# Patient Record
Sex: FEMALE | Race: WHITE | NOT HISPANIC OR LATINO
[De-identification: names, ages, dates, MRNs, and addresses within clinical notes are randomized per-mention and may not be internally consistent; named-entity substitution may affect disease eponyms.]

---

## 2020-06-26 ENCOUNTER — ERX REFILL RESPONSE (OUTPATIENT)
Age: 75
End: 2020-06-26

## 2020-06-26 ENCOUNTER — TELEPHONE ENCOUNTER (OUTPATIENT)
Dept: URBAN - METROPOLITAN AREA CLINIC 92 | Facility: CLINIC | Age: 75
End: 2020-06-26

## 2020-06-26 RX ORDER — BUDESONIDE 3 MG/1
TAKE 3 CAPSULES BY MOUTH IN THE MORNING CAPSULE, GELATIN COATED ORAL ONCE A DAY
Qty: 30 | Refills: 0

## 2020-09-28 ENCOUNTER — OFFICE VISIT (OUTPATIENT)
Dept: URBAN - NONMETROPOLITAN AREA CLINIC 2 | Facility: CLINIC | Age: 75
End: 2020-09-28

## 2020-11-23 ENCOUNTER — ERX REFILL RESPONSE (OUTPATIENT)
Age: 75
End: 2020-11-23

## 2020-11-23 RX ORDER — BUDESONIDE 3 MG/1
TAKE 3 CAPSULES BY MOUTH IN THE MORNING CAPSULE, GELATIN COATED ORAL
Qty: 270 | Refills: 1

## 2020-12-07 ENCOUNTER — OFFICE VISIT (OUTPATIENT)
Dept: URBAN - NONMETROPOLITAN AREA CLINIC 2 | Facility: CLINIC | Age: 75
End: 2020-12-07
Payer: COMMERCIAL

## 2020-12-07 VITALS
WEIGHT: 139 LBS | HEART RATE: 88 BPM | DIASTOLIC BLOOD PRESSURE: 80 MMHG | BODY MASS INDEX: 23.16 KG/M2 | SYSTOLIC BLOOD PRESSURE: 155 MMHG | TEMPERATURE: 97.6 F | HEIGHT: 65 IN

## 2020-12-07 DIAGNOSIS — Z12.11 COLON CANCER SCREENING: ICD-10-CM

## 2020-12-07 DIAGNOSIS — K52.839 MICROSCOPIC COLITIS: ICD-10-CM

## 2020-12-07 PROCEDURE — G8420 CALC BMI NORM PARAMETERS: HCPCS | Performed by: NURSE PRACTITIONER

## 2020-12-07 PROCEDURE — 99213 OFFICE O/P EST LOW 20 MIN: CPT | Performed by: NURSE PRACTITIONER

## 2020-12-07 PROCEDURE — G9903 PT SCRN TBCO ID AS NON USER: HCPCS | Performed by: NURSE PRACTITIONER

## 2020-12-07 PROCEDURE — G8427 DOCREV CUR MEDS BY ELIG CLIN: HCPCS | Performed by: NURSE PRACTITIONER

## 2020-12-07 RX ORDER — CALCIUM PHOSPHATE TRIB/VIT D3 250-0.01MG
TABLET,CHEWABLE ORAL
Qty: 0 | Refills: 0 | Status: ACTIVE | COMMUNITY
Start: 1900-01-01

## 2020-12-07 RX ORDER — DIPHENOXYLATE HYDROCHLORIDE AND ATROPINE SULFATE 2.5; .025 MG/1; MG/1
TAKE  TABLET BY ORAL ROUTE AS NEEDED TABLET ORAL
Qty: 0 | Refills: 0 | Status: ACTIVE | COMMUNITY
Start: 1900-01-01

## 2020-12-07 RX ORDER — BUDESONIDE 3 MG/1
TAKE 3 CAPSULES BY MOUTH IN THE MORNING CAPSULE, GELATIN COATED ORAL
Qty: 270 | Refills: 1 | Status: ACTIVE | COMMUNITY

## 2020-12-07 RX ORDER — BISOPROLOL FUMARATE 10 MG/1
TAKE 1 TABLET (10 MG) BY ORAL ROUTE ONCE DAILY TABLET, FILM COATED ORAL 1
Qty: 0 | Refills: 0 | Status: ACTIVE | COMMUNITY
Start: 1900-01-01

## 2020-12-07 RX ORDER — MELATONIN/PYRIDOXINE HCL (B6) 5 MG-10 MG
TAKE 1 CAPSULE BY ORAL ROUTE DAILY TABLET,IMMED, EXTENDED RELEASE, BIPHASIC ORAL 1
Qty: 0 | Refills: 0 | Status: ACTIVE | COMMUNITY
Start: 1900-01-01

## 2020-12-07 RX ORDER — METFORMIN HYDROCHLORIDE 500 MG/1
TAKE 4 TABLETS BY ORAL ROUTE DAILY TABLET, COATED ORAL 1
Qty: 0 | Refills: 0 | Status: ACTIVE | COMMUNITY
Start: 1900-01-01

## 2020-12-07 RX ORDER — LEVOTHYROXINE SODIUM 0.05 MG/1
TAKE 1 TABLET (50 MCG) BY ORAL ROUTE ONCE DAILY TABLET ORAL 1
Qty: 0 | Refills: 0 | Status: ACTIVE | COMMUNITY
Start: 1900-01-01

## 2020-12-07 RX ORDER — RAMIPRIL 2.5 MG/1
TAKE 1 CAPSULE (2.5 MG) BY ORAL ROUTE ONCE DAILY CAPSULE ORAL 1
Qty: 0 | Refills: 0 | Status: ACTIVE | COMMUNITY
Start: 1900-01-01

## 2020-12-07 RX ORDER — ZOLPIDEM TARTRATE 10 MG/1
TAKE 1 TABLET (10 MG) BY ORAL ROUTE ONCE DAILY AT BEDTIME TABLET, FILM COATED ORAL 1
Qty: 0 | Refills: 0 | Status: ACTIVE | COMMUNITY
Start: 1900-01-01

## 2020-12-07 RX ORDER — EZETIMIBE AND SIMVASTATIN 10; 40 MG/1; MG/1
TAKE 1 TABLET BY ORAL ROUTE ONCE DAILY TABLET ORAL 1
Qty: 0 | Refills: 0 | Status: ACTIVE | COMMUNITY
Start: 1900-01-01

## 2020-12-07 RX ORDER — POTASSIUM CHLORIDE 1.5 G/1
POWDER, FOR SOLUTION ORAL
Qty: 0 | Refills: 0 | Status: ACTIVE | COMMUNITY
Start: 1900-01-01

## 2020-12-07 RX ORDER — BUDESONIDE 3 MG/1
TAKE 3 CAPSULES BY MOUTH IN THE MORNING CAPSULE, GELATIN COATED ORAL ONCE A DAY
Qty: 270 | Refills: 3

## 2020-12-07 RX ORDER — ASCORBIC ACID 500 MG
TAKE 1 CAPSULE BY ORAL ROUTE DAILY CAPSULE, EXTENDED RELEASE ORAL 1
Qty: 0 | Refills: 0 | Status: ACTIVE | COMMUNITY
Start: 1900-01-01

## 2020-12-07 RX ORDER — TRIAMCINOLONE ACETONIDE 1 MG/ML
LOTION TOPICAL
Qty: 0 | Refills: 0 | Status: ACTIVE | COMMUNITY
Start: 1900-01-01

## 2020-12-07 RX ORDER — CHLORTHALIDONE 25 MG/1
TABLET ORAL
Qty: 0 | Refills: 0 | Status: ACTIVE | COMMUNITY
Start: 1900-01-01

## 2020-12-07 RX ORDER — ALLOPURINOL 300 MG/1
TAKE 1 TABLET (300 MG) BY ORAL ROUTE ONCE DAILY TABLET ORAL 1
Qty: 0 | Refills: 0 | Status: ACTIVE | COMMUNITY
Start: 1900-01-01

## 2020-12-07 NOTE — HPI-TODAY'S VISIT:
12/7/2020  Ms. Marnie Escobedo returns for follow up of collagenous colitis. She was dx in 2016. She has been on budesonide 3mg daily since this time. She has tried to stop taking this but her symptoms always return. Over the last year, she has been having more flare ups. These will last for about 3 weeks with 7 BM a day followed by 3 good weeks with 2 BMs a day. She will take imodium if she has to go out for the day otherwise she just deals with the diarrhea. She had recent labs and her GFR is lower and she is low in B12. She is very hesitant to change her medications due to kidney function. CS

## 2020-12-07 NOTE — HPI-OTHER HISTORIES
12/11/2018  Ms. Marnie Escobedo returns for follow up of collagenous colitis. She was dx in 2016. She has been on budesonide 3mg daily since this time. She has tried to taper off and take only as needed but her symptoms return. She will at times need two pills a day. Overall, she is doing and feeling well. She has identified a few triggers which helps.  CS

## 2021-01-21 ENCOUNTER — OFFICE VISIT (OUTPATIENT)
Dept: URBAN - NONMETROPOLITAN AREA CLINIC 2 | Facility: CLINIC | Age: 76
End: 2021-01-21

## 2021-02-17 ENCOUNTER — OFFICE VISIT (OUTPATIENT)
Dept: URBAN - NONMETROPOLITAN AREA CLINIC 2 | Facility: CLINIC | Age: 76
End: 2021-02-17
Payer: COMMERCIAL

## 2021-02-17 ENCOUNTER — TELEPHONE ENCOUNTER (OUTPATIENT)
Dept: URBAN - METROPOLITAN AREA CLINIC 92 | Facility: CLINIC | Age: 76
End: 2021-02-17

## 2021-02-17 DIAGNOSIS — Z12.11 COLON CANCER SCREENING: ICD-10-CM

## 2021-02-17 DIAGNOSIS — K52.839 MICROSCOPIC COLITIS: ICD-10-CM

## 2021-02-17 PROCEDURE — G8420 CALC BMI NORM PARAMETERS: HCPCS | Performed by: NURSE PRACTITIONER

## 2021-02-17 PROCEDURE — 99213 OFFICE O/P EST LOW 20 MIN: CPT | Performed by: NURSE PRACTITIONER

## 2021-02-17 PROCEDURE — G8427 DOCREV CUR MEDS BY ELIG CLIN: HCPCS | Performed by: NURSE PRACTITIONER

## 2021-02-17 PROCEDURE — G9903 PT SCRN TBCO ID AS NON USER: HCPCS | Performed by: NURSE PRACTITIONER

## 2021-02-17 RX ORDER — COLESTIPOL HYDROCHLORIDE 1 G/1
1 TABLET 30MINS PRIOR TO A MEAL TABLET, FILM COATED ORAL BID
Qty: 60 | Refills: 6
Start: 2021-02-17

## 2021-02-17 RX ORDER — ALLOPURINOL 300 MG/1
TAKE 1 TABLET (300 MG) BY ORAL ROUTE ONCE DAILY TABLET ORAL 1
Qty: 0 | Refills: 0 | Status: ACTIVE | COMMUNITY
Start: 1900-01-01

## 2021-02-17 RX ORDER — RAMIPRIL 2.5 MG/1
TAKE 1 CAPSULE (2.5 MG) BY ORAL ROUTE ONCE DAILY CAPSULE ORAL 1
Qty: 0 | Refills: 0 | Status: ACTIVE | COMMUNITY
Start: 1900-01-01

## 2021-02-17 RX ORDER — CHOLESTYRAMINE 4 G/9G
1 SCOOP POWDER, FOR SUSPENSION ORAL BID
Qty: 1 CANISTER | Refills: 6
Start: 2021-02-17

## 2021-02-17 RX ORDER — CHLORTHALIDONE 25 MG/1
TABLET ORAL
Qty: 0 | Refills: 0 | Status: ACTIVE | COMMUNITY
Start: 1900-01-01

## 2021-02-17 RX ORDER — ASCORBIC ACID 500 MG
TAKE 1 CAPSULE BY ORAL ROUTE DAILY CAPSULE, EXTENDED RELEASE ORAL 1
Qty: 0 | Refills: 0 | Status: ACTIVE | COMMUNITY
Start: 1900-01-01

## 2021-02-17 RX ORDER — TRIAMCINOLONE ACETONIDE 1 MG/ML
LOTION TOPICAL
Qty: 0 | Refills: 0 | Status: ACTIVE | COMMUNITY
Start: 1900-01-01

## 2021-02-17 RX ORDER — CALCIUM PHOSPHATE TRIB/VIT D3 250-0.01MG
TABLET,CHEWABLE ORAL
Qty: 0 | Refills: 0 | Status: ACTIVE | COMMUNITY
Start: 1900-01-01

## 2021-02-17 RX ORDER — MELATONIN/PYRIDOXINE HCL (B6) 5 MG-10 MG
TAKE 1 CAPSULE BY ORAL ROUTE DAILY TABLET,IMMED, EXTENDED RELEASE, BIPHASIC ORAL 1
Qty: 0 | Refills: 0 | Status: ACTIVE | COMMUNITY
Start: 1900-01-01

## 2021-02-17 RX ORDER — ZOLPIDEM TARTRATE 10 MG/1
TAKE 1 TABLET (10 MG) BY ORAL ROUTE ONCE DAILY AT BEDTIME TABLET, FILM COATED ORAL 1
Qty: 0 | Refills: 0 | Status: ACTIVE | COMMUNITY
Start: 1900-01-01

## 2021-02-17 RX ORDER — EZETIMIBE AND SIMVASTATIN 10; 40 MG/1; MG/1
TAKE 1 TABLET BY ORAL ROUTE ONCE DAILY TABLET ORAL 1
Qty: 0 | Refills: 0 | Status: ACTIVE | COMMUNITY
Start: 1900-01-01

## 2021-02-17 RX ORDER — BISOPROLOL FUMARATE 10 MG/1
TAKE 1 TABLET (10 MG) BY ORAL ROUTE ONCE DAILY TABLET, FILM COATED ORAL 1
Qty: 0 | Refills: 0 | Status: ACTIVE | COMMUNITY
Start: 1900-01-01

## 2021-02-17 RX ORDER — LEVOTHYROXINE SODIUM 0.05 MG/1
TAKE 1 TABLET (50 MCG) BY ORAL ROUTE ONCE DAILY TABLET ORAL 1
Qty: 0 | Refills: 0 | Status: ACTIVE | COMMUNITY
Start: 1900-01-01

## 2021-02-17 RX ORDER — POTASSIUM CHLORIDE 1.5 G/1
POWDER, FOR SOLUTION ORAL
Qty: 0 | Refills: 0 | Status: ACTIVE | COMMUNITY
Start: 1900-01-01

## 2021-02-17 RX ORDER — BUDESONIDE 3 MG/1
TAKE 3 CAPSULES BY MOUTH IN THE MORNING CAPSULE, GELATIN COATED ORAL ONCE A DAY
Qty: 270 | Refills: 3

## 2021-02-17 RX ORDER — METFORMIN HYDROCHLORIDE 500 MG/1
TAKE 4 TABLETS BY ORAL ROUTE DAILY TABLET, COATED ORAL 1
Qty: 0 | Refills: 0 | Status: ACTIVE | COMMUNITY
Start: 1900-01-01

## 2021-02-17 RX ORDER — BUDESONIDE 3 MG/1
TAKE 3 CAPSULES BY MOUTH IN THE MORNING CAPSULE, GELATIN COATED ORAL ONCE A DAY
Qty: 270 | Refills: 3 | Status: ACTIVE | COMMUNITY

## 2021-02-17 RX ORDER — CHOLESTYRAMINE 4 G/9G
1 SCOOP POWDER, FOR SUSPENSION ORAL BID
Qty: 1 CANISTER | Refills: 6 | OUTPATIENT
Start: 2021-02-17

## 2021-02-17 RX ORDER — COLESTIPOL HYDROCHLORIDE 1 G/1
1 TABLET 30MINS PRIOR TO A MEAL TABLET, FILM COATED ORAL BID
Qty: 60 | Refills: 6 | OUTPATIENT
Start: 2021-02-17

## 2021-02-17 RX ORDER — DIPHENOXYLATE HYDROCHLORIDE AND ATROPINE SULFATE 2.5; .025 MG/1; MG/1
TAKE  TABLET BY ORAL ROUTE AS NEEDED TABLET ORAL
Qty: 0 | Refills: 0 | Status: ACTIVE | COMMUNITY
Start: 1900-01-01

## 2021-02-17 NOTE — HPI-TODAY'S VISIT:
2/17/2021  Ms. Marnie Escobedo returns for follow up of collagenous colitis. She was dx in 2016. She has been on budesonide 3mg daily since this time. She has tried to stop taking this but her symptoms always return. She has never taken 9mg. She has tried 6mg for a week and it did slow her diarrhea. She is back to only one pill a day and continues to have bad days with 6 or more BM a day. She is taking 3 imodium a day and it does not seem to be helping. She talked with Dr Lincoln and ready to increase her budesonide. CS

## 2021-02-17 NOTE — HPI-OTHER HISTORIES
12/11/2018  Ms. Marnie Escobedo returns for follow up of collagenous colitis. She was dx in 2016. She has been on budesonide 3mg daily since this time. She has tried to taper off and take only as needed but her symptoms return. She will at times need two pills a day. Overall, she is doing and feeling well. She has identified a few triggers which helps.  CS   12/7/2020  Ms. Marnie Escobedo returns for follow up of collagenous colitis. She was dx in 2016. She has been on budesonide 3mg daily since this time. She has tried to stop taking this but her symptoms always return. Over the last year, she has been having more flare ups. These will last for about 3 weeks with 7 BM a day followed by 3 good weeks with 2 BMs a day. She will take imodium if she has to go out for the day otherwise she just deals with the diarrhea. She had recent labs and her GFR is lower and she is low in B12. She is very hesitant to change her medications due to kidney function. CS

## 2021-04-12 ENCOUNTER — DASHBOARD ENCOUNTERS (OUTPATIENT)
Age: 76
End: 2021-04-12

## 2021-04-12 ENCOUNTER — LAB OUTSIDE AN ENCOUNTER (OUTPATIENT)
Dept: URBAN - NONMETROPOLITAN AREA CLINIC 2 | Facility: CLINIC | Age: 76
End: 2021-04-12

## 2021-04-12 ENCOUNTER — OFFICE VISIT (OUTPATIENT)
Dept: URBAN - NONMETROPOLITAN AREA CLINIC 2 | Facility: CLINIC | Age: 76
End: 2021-04-12
Payer: COMMERCIAL

## 2021-04-12 VITALS
BODY MASS INDEX: 22.66 KG/M2 | HEIGHT: 65 IN | HEART RATE: 94 BPM | TEMPERATURE: 97.2 F | SYSTOLIC BLOOD PRESSURE: 118 MMHG | WEIGHT: 136 LBS | DIASTOLIC BLOOD PRESSURE: 76 MMHG

## 2021-04-12 DIAGNOSIS — K52.839 MICROSCOPIC COLITIS: ICD-10-CM

## 2021-04-12 DIAGNOSIS — Z12.11 COLON CANCER SCREENING: ICD-10-CM

## 2021-04-12 DIAGNOSIS — R10.32 LEFT LOWER QUADRANT ABDOMINAL PAIN: ICD-10-CM

## 2021-04-12 DIAGNOSIS — K57.92 DIVERTICULITIS: ICD-10-CM

## 2021-04-12 PROBLEM — 307496006: Status: ACTIVE | Noted: 2021-04-12

## 2021-04-12 PROCEDURE — 99214 OFFICE O/P EST MOD 30 MIN: CPT | Performed by: NURSE PRACTITIONER

## 2021-04-12 RX ORDER — TRIAMCINOLONE ACETONIDE 1 MG/ML
LOTION TOPICAL
Qty: 0 | Refills: 0 | Status: ACTIVE | COMMUNITY
Start: 1900-01-01

## 2021-04-12 RX ORDER — COLESTIPOL HYDROCHLORIDE 1 G/1
1 TABLET 30MINS PRIOR TO A MEAL TABLET, FILM COATED ORAL BID
Qty: 60 | Refills: 6 | Status: ACTIVE | COMMUNITY
Start: 2021-02-17

## 2021-04-12 RX ORDER — RAMIPRIL 2.5 MG/1
TAKE 1 CAPSULE (2.5 MG) BY ORAL ROUTE ONCE DAILY CAPSULE ORAL 1
Qty: 0 | Refills: 0 | Status: ACTIVE | COMMUNITY
Start: 1900-01-01

## 2021-04-12 RX ORDER — POTASSIUM CHLORIDE 1.5 G/1
POWDER, FOR SOLUTION ORAL
Qty: 0 | Refills: 0 | Status: ACTIVE | COMMUNITY
Start: 1900-01-01

## 2021-04-12 RX ORDER — CHOLESTYRAMINE 4 G/9G
1 SCOOP POWDER, FOR SUSPENSION ORAL BID
Qty: 1 CANISTER | Refills: 6 | Status: ACTIVE | COMMUNITY
Start: 2021-02-17

## 2021-04-12 RX ORDER — BUDESONIDE 3 MG/1
TAKE 3 CAPSULES BY MOUTH IN THE MORNING CAPSULE, GELATIN COATED ORAL ONCE A DAY
Qty: 270 | Refills: 3 | Status: ACTIVE | COMMUNITY

## 2021-04-12 RX ORDER — ZOLPIDEM TARTRATE 10 MG/1
TAKE 1 TABLET (10 MG) BY ORAL ROUTE ONCE DAILY AT BEDTIME TABLET, FILM COATED ORAL 1
Qty: 0 | Refills: 0 | Status: ACTIVE | COMMUNITY
Start: 1900-01-01

## 2021-04-12 RX ORDER — CALCIUM PHOSPHATE TRIB/VIT D3 250-0.01MG
TABLET,CHEWABLE ORAL
Qty: 0 | Refills: 0 | Status: ACTIVE | COMMUNITY
Start: 1900-01-01

## 2021-04-12 RX ORDER — BUDESONIDE 3 MG/1
TAKE 3 CAPSULES BY MOUTH IN THE MORNING CAPSULE, GELATIN COATED ORAL ONCE A DAY
Qty: 270 | Refills: 3

## 2021-04-12 RX ORDER — MELATONIN/PYRIDOXINE HCL (B6) 5 MG-10 MG
TAKE 1 CAPSULE BY ORAL ROUTE DAILY TABLET,IMMED, EXTENDED RELEASE, BIPHASIC ORAL 1
Qty: 0 | Refills: 0 | Status: ACTIVE | COMMUNITY
Start: 1900-01-01

## 2021-04-12 RX ORDER — COLESTIPOL HYDROCHLORIDE 1 G/1
1 TABLET 30MINS PRIOR TO A MEAL TABLET, FILM COATED ORAL BID
Qty: 60 | Refills: 6

## 2021-04-12 RX ORDER — EZETIMIBE AND SIMVASTATIN 10; 40 MG/1; MG/1
TAKE 1 TABLET BY ORAL ROUTE ONCE DAILY TABLET ORAL 1
Qty: 0 | Refills: 0 | Status: ACTIVE | COMMUNITY
Start: 1900-01-01

## 2021-04-12 RX ORDER — DIPHENOXYLATE HYDROCHLORIDE AND ATROPINE SULFATE 2.5; .025 MG/1; MG/1
TAKE  TABLET BY ORAL ROUTE AS NEEDED TABLET ORAL
Qty: 0 | Refills: 0 | Status: ACTIVE | COMMUNITY
Start: 1900-01-01

## 2021-04-12 RX ORDER — METFORMIN HYDROCHLORIDE 500 MG/1
TAKE 4 TABLETS BY ORAL ROUTE DAILY TABLET, COATED ORAL 1
Qty: 0 | Refills: 0 | Status: ACTIVE | COMMUNITY
Start: 1900-01-01

## 2021-04-12 RX ORDER — LEVOTHYROXINE SODIUM 0.05 MG/1
TAKE 1 TABLET (50 MCG) BY ORAL ROUTE ONCE DAILY TABLET ORAL 1
Qty: 0 | Refills: 0 | Status: ACTIVE | COMMUNITY
Start: 1900-01-01

## 2021-04-12 RX ORDER — BISOPROLOL FUMARATE 10 MG/1
TAKE 1 TABLET (10 MG) BY ORAL ROUTE ONCE DAILY TABLET, FILM COATED ORAL 1
Qty: 0 | Refills: 0 | Status: ACTIVE | COMMUNITY
Start: 1900-01-01

## 2021-04-12 RX ORDER — ASCORBIC ACID 500 MG
TAKE 1 CAPSULE BY ORAL ROUTE DAILY CAPSULE, EXTENDED RELEASE ORAL 1
Qty: 0 | Refills: 0 | Status: ACTIVE | COMMUNITY
Start: 1900-01-01

## 2021-04-12 RX ORDER — CHLORTHALIDONE 25 MG/1
TABLET ORAL
Qty: 0 | Refills: 0 | Status: ACTIVE | COMMUNITY
Start: 1900-01-01

## 2021-04-12 RX ORDER — ALLOPURINOL 300 MG/1
TAKE 1 TABLET (300 MG) BY ORAL ROUTE ONCE DAILY TABLET ORAL 1
Qty: 0 | Refills: 0 | Status: ACTIVE | COMMUNITY
Start: 1900-01-01

## 2021-04-12 RX ORDER — CHOLESTYRAMINE 4 G/9G
1 SCOOP POWDER, FOR SUSPENSION ORAL BID
Qty: 1 CANISTER | Refills: 6

## 2021-04-12 NOTE — HPI-OTHER HISTORIES
12/11/2018  Ms. Marnie Escobedo returns for follow up of collagenous colitis. She was dx in 2016. She has been on budesonide 3mg daily since this time. She has tried to taper off and take only as needed but her symptoms return. She will at times need two pills a day. Overall, she is doing and feeling well. She has identified a few triggers which helps.  CS   12/7/2020  Ms. Marnie Escobedo returns for follow up of collagenous colitis. She was dx in 2016. She has been on budesonide 3mg daily since this time. She has tried to stop taking this but her symptoms always return. Over the last year, she has been having more flare ups. These will last for about 3 weeks with 7 BM a day followed by 3 good weeks with 2 BMs a day. She will take imodium if she has to go out for the day otherwise she just deals with the diarrhea. She had recent labs and her GFR is lower and she is low in B12. She is very hesitant to change her medications due to kidney function. CS   2/17/2021  Ms. Marnie Escobedo returns for follow up of collagenous colitis. She was dx in 2016. She has been on budesonide 3mg daily since this time. She has tried to stop taking this but her symptoms always return. She has never taken 9mg. She has tried 6mg for a week and it did slow her diarrhea. She is back to only one pill a day and continues to have bad days with 6 or more BM a day. She is taking 3 imodium a day and it does not seem to be helping. She talked with Dr Lincoln and ready to increase her budesonide. CS

## 2021-04-12 NOTE — HPI-TODAY'S VISIT:
4/12/2021  Ms. Marnie Escobedo returns for follow up of collagenous colitis. She was dx in 2016. She has been on budesonide 3mg daily since this time. She has tried to stop taking this but her symptoms always return. She has never taken 9mg. She tried 6mg for a week and it did slow her diarrhea. She then went back to only one pill a day and continued to have bad days with 6 or more BM a day. She added 3 imodium a day without any improvement. At her last OV, we increased her budesonide to 9mg daily and prn colestipol. Today, she reports taking about 2 weeks of 9mg and having constipation. She then strained and started having LLQ pain. She developed fever and chills. She saw Dr Rodas and he gave her ceftinir and bentyl prn. The pain got better. The constipation continued. Her pain and fever returned. She was given a second round. She has two pills left. She is still feeling poorly. She stopped the budesonide three days ago. Her stool is not fully formed but less urgent and once a day.  She is traveling to [place] for her grandson's 4th birthday on Wednesday. CS

## 2021-04-13 ENCOUNTER — TELEPHONE ENCOUNTER (OUTPATIENT)
Dept: URBAN - METROPOLITAN AREA CLINIC 92 | Facility: CLINIC | Age: 76
End: 2021-04-13

## 2022-01-19 ENCOUNTER — ERX REFILL RESPONSE (OUTPATIENT)
Dept: URBAN - NONMETROPOLITAN AREA CLINIC 2 | Facility: CLINIC | Age: 77
End: 2022-01-19

## 2022-01-19 RX ORDER — BUDESONIDE 3 MG/1
TAKE 3 CAPSULES BY MOUTH IN THE MORNING ONCE A DAY 90 DAYS CAPSULE, GELATIN COATED ORAL
Qty: 270 CAPSULE | Refills: 4 | OUTPATIENT

## 2022-01-19 RX ORDER — BUDESONIDE 3 MG/1
TAKE 3 CAPSULES BY MOUTH IN THE MORNING CAPSULE, GELATIN COATED ORAL ONCE A DAY
Qty: 270 | Refills: 3 | OUTPATIENT

## 2022-02-16 ENCOUNTER — ERX REFILL RESPONSE (OUTPATIENT)
Dept: URBAN - NONMETROPOLITAN AREA CLINIC 2 | Facility: CLINIC | Age: 77
End: 2022-02-16

## 2022-02-16 RX ORDER — CHOLESTYRAMINE 4 G/9G
MIX 1 SCOOP AS DIRECTED TWICE A DAY POWDER, FOR SUSPENSION ORAL
Qty: 1134 GRAM | Refills: 3 | OUTPATIENT

## 2022-10-31 ENCOUNTER — ERX REFILL RESPONSE (OUTPATIENT)
Dept: URBAN - NONMETROPOLITAN AREA CLINIC 2 | Facility: CLINIC | Age: 77
End: 2022-10-31

## 2022-10-31 RX ORDER — BUDESONIDE 3 MG/1
TAKE 3 CAPSULES BY MOUTH IN THE MORNING ONCE A DAY 90 DAYS CAPSULE, GELATIN COATED ORAL
Qty: 270 CAPSULE | Refills: 0 | OUTPATIENT

## 2022-10-31 RX ORDER — BUDESONIDE 3 MG/1
TAKE 3 CAPSULES BY MOUTH IN THE MORNING ONCE A DAY 90 DAYS CAPSULE, GELATIN COATED ORAL
Qty: 270 CAPSULE | Refills: 4 | OUTPATIENT

## 2023-06-14 ENCOUNTER — ERX REFILL RESPONSE (OUTPATIENT)
Dept: URBAN - NONMETROPOLITAN AREA CLINIC 2 | Facility: CLINIC | Age: 78
End: 2023-06-14

## 2023-06-14 RX ORDER — BUDESONIDE 3 MG/1
TAKE 3 CAPSULES BY MOUTH IN THE MORNING ONCE A DAY 90 DAYS CAPSULE, GELATIN COATED ORAL
Qty: 270 CAPSULE | Refills: 0 | OUTPATIENT

## 2023-06-14 RX ORDER — BUDESONIDE 3 MG/1
TAKE 3 CAPSULES BY MOUTH IN THE MORNING ONCE A DAY FOR 90 DAYS CAPSULE, GELATIN COATED ORAL
Qty: 270 CAPSULE | Refills: 0 | OUTPATIENT

## 2024-05-13 ENCOUNTER — CLAIMS CREATED FROM THE CLAIM WINDOW (OUTPATIENT)
Dept: URBAN - METROPOLITAN AREA TELEHEALTH 2 | Facility: TELEHEALTH | Age: 79
End: 2024-05-13

## 2024-05-13 ENCOUNTER — OFFICE VISIT (OUTPATIENT)
Dept: URBAN - METROPOLITAN AREA TELEHEALTH 2 | Facility: TELEHEALTH | Age: 79
End: 2024-05-13
Payer: COMMERCIAL

## 2024-05-13 ENCOUNTER — TELEPHONE ENCOUNTER (OUTPATIENT)
Dept: URBAN - NONMETROPOLITAN AREA CLINIC 2 | Facility: CLINIC | Age: 79
End: 2024-05-13

## 2024-05-13 DIAGNOSIS — K57.92 DIVERTICULITIS: ICD-10-CM

## 2024-05-13 DIAGNOSIS — K52.839 MICROSCOPIC COLITIS: ICD-10-CM

## 2024-05-13 DIAGNOSIS — Z12.11 COLON CANCER SCREENING: ICD-10-CM

## 2024-05-13 DIAGNOSIS — R10.32 LEFT LOWER QUADRANT ABDOMINAL PAIN: ICD-10-CM

## 2024-05-13 PROCEDURE — 99213 OFFICE O/P EST LOW 20 MIN: CPT | Performed by: NURSE PRACTITIONER

## 2024-05-13 RX ORDER — CHOLESTYRAMINE 4 G/9G
MIX 1 SCOOP AS DIRECTED TWICE A DAY POWDER, FOR SUSPENSION ORAL
Qty: 1134 GRAM | Refills: 3 | Status: ACTIVE | COMMUNITY

## 2024-05-13 RX ORDER — METFORMIN HYDROCHLORIDE 500 MG/1
TAKE 4 TABLETS BY ORAL ROUTE DAILY TABLET, COATED ORAL 1
Qty: 0 | Refills: 0 | Status: ACTIVE | COMMUNITY
Start: 1900-01-01

## 2024-05-13 RX ORDER — BUDESONIDE 3 MG/1
TAKE 3 CAPSULES BY MOUTH IN THE MORNING ONCE A DAY FOR 90 DAYS CAPSULE, GELATIN COATED ORAL
Qty: 270 CAPSULE | Refills: 0 | Status: ACTIVE | COMMUNITY

## 2024-05-13 RX ORDER — BUDESONIDE 3 MG/1
TAKE 3 CAPSULES BY MOUTH IN THE MORNING CAPSULE, GELATIN COATED ORAL ONCE A DAY
Qty: 270 | Refills: 3

## 2024-05-13 RX ORDER — RAMIPRIL 2.5 MG/1
TAKE 1 CAPSULE (2.5 MG) BY ORAL ROUTE ONCE DAILY CAPSULE ORAL 1
Qty: 0 | Refills: 0 | Status: ACTIVE | COMMUNITY
Start: 1900-01-01

## 2024-05-13 RX ORDER — TRIAMCINOLONE ACETONIDE 1 MG/ML
LOTION TOPICAL
Qty: 0 | Refills: 0 | Status: ACTIVE | COMMUNITY
Start: 1900-01-01

## 2024-05-13 RX ORDER — CALCIUM PHOSPHATE TRIB/VIT D3 250-0.01MG
TABLET,CHEWABLE ORAL
Qty: 0 | Refills: 0 | Status: ACTIVE | COMMUNITY
Start: 1900-01-01

## 2024-05-13 RX ORDER — ALLOPURINOL 300 MG/1
TAKE 1 TABLET (300 MG) BY ORAL ROUTE ONCE DAILY TABLET ORAL 1
Qty: 0 | Refills: 0 | Status: ACTIVE | COMMUNITY
Start: 1900-01-01

## 2024-05-13 RX ORDER — COLESTIPOL HYDROCHLORIDE 1 G/1
1 TABLET 30MINS PRIOR TO A MEAL TABLET, FILM COATED ORAL BID
Qty: 60 | Refills: 6 | Status: ACTIVE | COMMUNITY

## 2024-05-13 RX ORDER — ASCORBIC ACID 500 MG
TAKE 1 CAPSULE BY ORAL ROUTE DAILY CAPSULE, EXTENDED RELEASE ORAL 1
Qty: 0 | Refills: 0 | Status: ACTIVE | COMMUNITY
Start: 1900-01-01

## 2024-05-13 RX ORDER — BISOPROLOL FUMARATE 10 MG/1
TAKE 1 TABLET (10 MG) BY ORAL ROUTE ONCE DAILY TABLET, FILM COATED ORAL 1
Qty: 0 | Refills: 0 | Status: ACTIVE | COMMUNITY
Start: 1900-01-01

## 2024-05-13 RX ORDER — CHLORTHALIDONE 25 MG/1
TABLET ORAL
Qty: 0 | Refills: 0 | Status: ACTIVE | COMMUNITY
Start: 1900-01-01

## 2024-05-13 RX ORDER — LEVOTHYROXINE SODIUM 0.05 MG/1
TAKE 1 TABLET (50 MCG) BY ORAL ROUTE ONCE DAILY TABLET ORAL 1
Qty: 0 | Refills: 0 | Status: ACTIVE | COMMUNITY
Start: 1900-01-01

## 2024-05-13 RX ORDER — DIPHENOXYLATE HYDROCHLORIDE AND ATROPINE SULFATE 2.5; .025 MG/1; MG/1
TAKE  TABLET BY ORAL ROUTE AS NEEDED TABLET ORAL
Qty: 0 | Refills: 0 | Status: ACTIVE | COMMUNITY
Start: 1900-01-01

## 2024-05-13 RX ORDER — MELATONIN/PYRIDOXINE HCL (B6) 5 MG-10 MG
TAKE 1 CAPSULE BY ORAL ROUTE DAILY TABLET,IMMED, EXTENDED RELEASE, BIPHASIC ORAL 1
Qty: 0 | Refills: 0 | Status: ACTIVE | COMMUNITY
Start: 1900-01-01

## 2024-05-13 RX ORDER — POTASSIUM CHLORIDE 1.5 G/1
POWDER, FOR SOLUTION ORAL
Qty: 0 | Refills: 0 | Status: ACTIVE | COMMUNITY
Start: 1900-01-01

## 2024-05-13 RX ORDER — EZETIMIBE AND SIMVASTATIN 10; 40 MG/1; MG/1
TAKE 1 TABLET BY ORAL ROUTE ONCE DAILY TABLET ORAL 1
Qty: 0 | Refills: 0 | Status: ACTIVE | COMMUNITY
Start: 1900-01-01

## 2024-05-13 RX ORDER — ZOLPIDEM TARTRATE 10 MG/1
TAKE 1 TABLET (10 MG) BY ORAL ROUTE ONCE DAILY AT BEDTIME TABLET, FILM COATED ORAL 1
Qty: 0 | Refills: 0 | Status: ACTIVE | COMMUNITY
Start: 1900-01-01

## 2024-10-14 ENCOUNTER — TELEPHONE ENCOUNTER (OUTPATIENT)
Dept: URBAN - NONMETROPOLITAN AREA CLINIC 2 | Facility: CLINIC | Age: 79
End: 2024-10-14